# Patient Record
Sex: MALE | Race: WHITE | Employment: FULL TIME | URBAN - METROPOLITAN AREA
[De-identification: names, ages, dates, MRNs, and addresses within clinical notes are randomized per-mention and may not be internally consistent; named-entity substitution may affect disease eponyms.]

---

## 2017-01-29 ENCOUNTER — HOSPITAL ENCOUNTER (EMERGENCY)
Facility: HOSPITAL | Age: 62
Discharge: HOME | End: 2017-01-29
Attending: EMERGENCY MEDICINE
Payer: COMMERCIAL

## 2017-01-29 ENCOUNTER — APPOINTMENT (OUTPATIENT)
Dept: GENERAL RADIOLOGY | Facility: HOSPITAL | Age: 62
End: 2017-01-29
Attending: EMERGENCY MEDICINE
Payer: COMMERCIAL

## 2017-01-29 VITALS
SYSTOLIC BLOOD PRESSURE: 104 MMHG | OXYGEN SATURATION: 97 % | BODY MASS INDEX: 23.03 KG/M2 | RESPIRATION RATE: 17 BRPM | HEIGHT: 72 IN | HEART RATE: 82 BPM | TEMPERATURE: 99 F | DIASTOLIC BLOOD PRESSURE: 74 MMHG | WEIGHT: 170 LBS

## 2017-01-29 DIAGNOSIS — R00.0 TACHYCARDIA: Primary | ICD-10-CM

## 2017-01-29 LAB
ATRIAL RATE: 101 BPM
BASOPHILS # BLD AUTO: 0.05 X10(3) UL (ref 0–0.1)
BASOPHILS NFR BLD AUTO: 1.1 %
BUN BLD-MCNC: 21 MG/DL (ref 8–20)
CALCIUM BLD-MCNC: 8.9 MG/DL (ref 8.3–10.3)
CHLORIDE: 104 MMOL/L (ref 101–111)
CO2: 29 MMOL/L (ref 22–32)
CREAT BLD-MCNC: 0.98 MG/DL (ref 0.7–1.3)
EOSINOPHIL # BLD AUTO: 0.06 X10(3) UL (ref 0–0.3)
EOSINOPHIL NFR BLD AUTO: 1.3 %
ERYTHROCYTE [DISTWIDTH] IN BLOOD BY AUTOMATED COUNT: 11.9 % (ref 11.5–16)
GLUCOSE BLD-MCNC: 139 MG/DL (ref 70–99)
HAV IGM SER QL: 2 MG/DL (ref 1.7–3)
HCT VFR BLD AUTO: 45.3 % (ref 37–53)
HGB BLD-MCNC: 16.4 G/DL (ref 13–17)
IMMATURE GRANULOCYTE COUNT: 0.02 X10(3) UL (ref 0–1)
IMMATURE GRANULOCYTE RATIO %: 0.4 %
LYMPHOCYTES # BLD AUTO: 0.94 X10(3) UL (ref 0.9–4)
LYMPHOCYTES NFR BLD AUTO: 20.1 %
MCH RBC QN AUTO: 32.4 PG (ref 27–33.2)
MCHC RBC AUTO-ENTMCNC: 36.2 G/DL (ref 31–37)
MCV RBC AUTO: 89.5 FL (ref 80–99)
MONOCYTES # BLD AUTO: 0.41 X10(3) UL (ref 0.1–0.6)
MONOCYTES NFR BLD AUTO: 8.8 %
NEUTROPHIL ABS PRELIM: 3.19 X10 (3) UL (ref 1.3–6.7)
NEUTROPHILS # BLD AUTO: 3.19 X10(3) UL (ref 1.3–6.7)
NEUTROPHILS NFR BLD AUTO: 68.3 %
P AXIS: 62 DEGREES
P-R INTERVAL: 184 MS
PLATELET # BLD AUTO: 217 10(3)UL (ref 150–450)
POTASSIUM SERPL-SCNC: 3.5 MMOL/L (ref 3.6–5.1)
Q-T INTERVAL: 340 MS
QRS DURATION: 92 MS
QTC CALCULATION (BEZET): 440 MS
R AXIS: 54 DEGREES
RBC # BLD AUTO: 5.06 X10(6)UL (ref 4.3–5.7)
RED CELL DISTRIBUTION WIDTH-SD: 38.6 FL (ref 35.1–46.3)
SODIUM SERPL-SCNC: 138 MMOL/L (ref 136–144)
T AXIS: 54 DEGREES
TSI SER-ACNC: 1.01 MIU/ML (ref 0.35–5.5)
VENTRICULAR RATE: 101 BPM
WBC # BLD AUTO: 4.7 X10(3) UL (ref 4–13)

## 2017-01-29 PROCEDURE — 99285 EMERGENCY DEPT VISIT HI MDM: CPT

## 2017-01-29 PROCEDURE — 71020 XR CHEST PA + LAT CHEST (CPT=71020): CPT

## 2017-01-29 PROCEDURE — 93005 ELECTROCARDIOGRAM TRACING: CPT

## 2017-01-29 PROCEDURE — 36415 COLL VENOUS BLD VENIPUNCTURE: CPT

## 2017-01-29 PROCEDURE — 83735 ASSAY OF MAGNESIUM: CPT | Performed by: EMERGENCY MEDICINE

## 2017-01-29 PROCEDURE — 84443 ASSAY THYROID STIM HORMONE: CPT | Performed by: EMERGENCY MEDICINE

## 2017-01-29 PROCEDURE — 93010 ELECTROCARDIOGRAM REPORT: CPT

## 2017-01-29 PROCEDURE — 80048 BASIC METABOLIC PNL TOTAL CA: CPT | Performed by: EMERGENCY MEDICINE

## 2017-01-29 PROCEDURE — 85025 COMPLETE CBC W/AUTO DIFF WBC: CPT | Performed by: EMERGENCY MEDICINE

## 2017-01-29 RX ORDER — CLARITHROMYCIN 500 MG/1
500 TABLET, COATED ORAL DAILY
COMMUNITY
End: 2017-07-26

## 2017-01-29 NOTE — ED INITIAL ASSESSMENT (HPI)
Pt arrives from home with c/o elevated HR. Patient states felt heart racing after he got out of the shower this morning. Unable to take deep breath, no c/o chest pain.

## 2017-01-29 NOTE — ED PROVIDER NOTES
Patient Seen in: [facility] Emergency Department    History   Patient presents with:  Arrythmia/Palpitations (cardiovascular)    Stated Complaint: palpitations    HPI    65-year-old male with a remote history of SVT status post ablation in 2001 pres EXTRA STRENGTH 160-105 MG OR CHEW,  2 TABLETS 4 TIMES DAILY PRN   ZOVIRAX 5 % EX OINT,  APPLY TO AFFECTED AREA PRN       No family history on file.       Smoking Status: Never Smoker                      Smokeless Status: Never Used Abnormality         Status                     ---------                               -----------         ------                     CBC W/ DIFFERENTIAL[[id number]]                              Final result                 Please view resu

## (undated) NOTE — ED AVS SNAPSHOT
[facility] Emergency Department    [facility]  [address] [address]    Phone:  [phone]    Fax:  [address]   MRN: [id number]    Department:  [facility] Emergency Department   Date of Visit:  1/ IF THERE IS ANY CHANGE OR WORSENING OF YOUR CONDITION, CALL YOUR PRIMARY CARE PHYSICIAN AT ONCE OR RETURN IMMEDIATELY TO THE EMERGENCY DEPARTMENT.     If you have been prescribed any medication(s), please fill your prescription right away and begin taking t

## (undated) NOTE — ED AVS SNAPSHOT
[facility] Emergency Department    [place]  [address] [address]    Phone:  [phone]    Fax:  [address]   MRN: [id number]    Department:  [facility] Emergency Department   Date of Visit:  1/ If you have any problems with your follow-up, please call our  at [phone]    Si usted tiene algun problema con plummer sequimiento, por favor llame a nuestro adminstrador de casos al [phone]    Expect to receive an electronic request Gerda Bautista [address] [facility] ([address]) [address] [facility] [address] ([address]) [address]  ([address]) [address] [facility] PROCEDURE:  XR CHEST PA + LAT CHEST (CPT=71020)     INDICATIONS:  palpitations     COMPARISON:  None. TECHNIQUE:  PA and lateral chest radiographs were obtained.      PATIENT STATED HISTORY:  Patient complains of not feeling well today, with elevated h